# Patient Record
(demographics unavailable — no encounter records)

---

## 2024-12-26 NOTE — HISTORY OF PRESENT ILLNESS
[FreeTextEntry6] : cough x 1 wk  yesterday c/o R  ear  pain  and St   foul breath noted by dad  no fevers good po,uo, sleep

## 2024-12-26 NOTE — DISCUSSION/SUMMARY
[FreeTextEntry1] : RS_neg TC SENT OUT WILL TREAT IF POSITIVE ADVISED SX TX, FLUIDS FEVER CONTROL F/U IF  SX WORSENS OR FEVER will rx w Amoxil for ROM

## 2024-12-26 NOTE — PHYSICAL EXAM
[Clear] : left tympanic membrane clear [Erythematous Oropharynx] : erythematous oropharynx [NL] : warm, clear [FreeTextEntry3] : R TM sl erythem superiorly

## 2025-01-10 NOTE — DISCUSSION/SUMMARY
[FreeTextEntry1] : Symptoms likely d/t viral gastritis. In order to maintain hydration consume "oral rehydration solution," such as Pedialyte or low calorie sports drinks. If vomiting, try to give child a few teaspoons of fluid every few minutes.  Stick to bland / BRAT diet and advance as tolerated. Discussed signs and symptoms of dehydration and when to seek medical attention.  Follow up if no improvement in 1-2 days.

## 2025-01-10 NOTE — HISTORY OF PRESENT ILLNESS
[FreeTextEntry6] : Pt BIB Mother for c/o vomiting and fever tmax 100.2 since last night tolerating fluids denies cough, congestion, ST denies diarrhea sibling with vomiting and diarrhea earlier this week

## 2025-01-24 NOTE — HISTORY OF PRESENT ILLNESS
[Father] : father [Vitamin] : Patient takes vitamin daily [Normal] : Normal [Brushing teeth] : Brushing teeth [Yes] : Patient goes to dentist yearly [In ] : In  [Adequate performance] : Adequate performance [FreeTextEntry7] : s/p AGE [de-identified] : varied foods [FreeTextEntry1] :  -uses Rx HC for AD [de-identified] : + AIS

## 2025-01-24 NOTE — PHYSICAL EXAM

## 2025-01-24 NOTE — DISCUSSION/SUMMARY
[Normal Growth] : growth [Normal Development] : development  [School Readiness] : school readiness [Nutrition and Physical Activity] : nutrition and physical activity [Oral Health] : oral health [] : The components of the vaccine(s) to be administered today are listed in the plan of care. The disease(s) for which the vaccine(s) are intended to prevent and the risks have been discussed with the caretaker.  The risks are also included in the appropriate vaccination information statements which have been provided to the patient's caregiver.  The caregiver has given consent to vaccinate. [FreeTextEntry1] :  labs '24

## 2025-01-24 NOTE — HISTORY OF PRESENT ILLNESS
[Father] : father [Vitamin] : Patient takes vitamin daily [Normal] : Normal [Brushing teeth] : Brushing teeth [Yes] : Patient goes to dentist yearly [In ] : In  [Adequate performance] : Adequate performance [FreeTextEntry7] : s/p AGE [de-identified] : varied foods [FreeTextEntry1] :  -uses Rx HC for AD [de-identified] : + AIS

## 2025-01-24 NOTE — PHYSICAL EXAM
[Alert] : alert [No Acute Distress] : no acute distress [Playful] : playful [Normocephalic] : normocephalic [Conjunctivae with no discharge] : conjunctivae with no discharge [PERRL] : PERRL [EOMI Bilateral] : EOMI bilateral [Auricles Well Formed] : auricles well formed [Clear Tympanic membranes with present light reflex and bony landmarks] : clear tympanic membranes with present light reflex and bony landmarks [No Discharge] : no discharge [Nares Patent] : nares patent [Pink Nasal Mucosa] : pink nasal mucosa [Palate Intact] : palate intact [Nonerythematous Oropharynx] : nonerythematous oropharynx [Uvula Midline] : uvula midline [No Caries] : no caries [Trachea Midline] : trachea midline [Supple, full passive range of motion] : supple, full passive range of motion [No Palpable Masses] : no palpable masses [Symmetric Chest Rise] : symmetric chest rise [Clear to Auscultation Bilaterally] : clear to auscultation bilaterally [Normoactive Precordium] : normoactive precordium [Regular Rate and Rhythm] : regular rate and rhythm [Normal S1, S2 present] : normal S1, S2 present [No Murmurs] : no murmurs [+2 Femoral Pulses] : +2 femoral pulses [Soft] : soft [NonTender] : non tender [Non Distended] : non distended [Normoactive Bowel Sounds] : normoactive bowel sounds [No Hepatomegaly] : no hepatomegaly [No Splenomegaly] : no splenomegaly [Guillermo 1] : Guillermo 1 [No Clitoromegaly] : no clitoromegaly [Normal Vagina Introitus] : normal vagina introitus [Patent] : patent [Normally Placed] : normally placed [No Abnormal Lymph Nodes Palpated] : no abnormal lymph nodes palpated [Symmetric Buttocks Creases] : symmetric buttocks creases [No Gait Asymmetry] : no gait asymmetry [Symmetric Hip Rotation] : symmetric hip rotation [No pain or deformities with palpation of bone, muscles, joints] : no pain or deformities with palpation of bone, muscles, joints [Normal Muscle Tone] : normal muscle tone [No Spinal Dimple] : no spinal dimple [NoTuft of Hair] : no tuft of hair [Straight] : straight [+2 Patella DTR] : +2 patella DTR [Cranial Nerves Grossly Intact] : cranial nerves grossly intact [No Rash or Lesions] : no rash or lesions

## 2025-03-06 NOTE — DISCUSSION/SUMMARY
[FreeTextEntry1] : RS  neg TC SENT OUT WILL TREAT IF POSITIVE ADVISED SX TX, FLUIDS FEVER CONTROL F/U IF  SX WORSENS OR FEVER > 2-3 d

## 2025-03-07 NOTE — DISCUSSION/SUMMARY
[FreeTextEntry1] : Complete 10 days of antibiotic. Provide ibuprofen as needed for pain or fever. If no improvement within 48 hours return for re-evaluation. Advised sx tx of URI sx.